# Patient Record
Sex: MALE | Race: WHITE | NOT HISPANIC OR LATINO | Employment: FULL TIME | URBAN - METROPOLITAN AREA
[De-identification: names, ages, dates, MRNs, and addresses within clinical notes are randomized per-mention and may not be internally consistent; named-entity substitution may affect disease eponyms.]

---

## 2018-07-23 ENCOUNTER — OFFICE VISIT (OUTPATIENT)
Dept: GASTROENTEROLOGY | Facility: CLINIC | Age: 33
End: 2018-07-23

## 2018-07-23 VITALS
HEIGHT: 70 IN | BODY MASS INDEX: 29.12 KG/M2 | DIASTOLIC BLOOD PRESSURE: 82 MMHG | WEIGHT: 203.4 LBS | TEMPERATURE: 97.8 F | SYSTOLIC BLOOD PRESSURE: 110 MMHG

## 2018-07-23 DIAGNOSIS — R10.13 EPIGASTRIC PAIN: ICD-10-CM

## 2018-07-23 DIAGNOSIS — K21.9 GASTROESOPHAGEAL REFLUX DISEASE, ESOPHAGITIS PRESENCE NOT SPECIFIED: ICD-10-CM

## 2018-07-23 DIAGNOSIS — R10.9 ABDOMINAL PAIN, UNSPECIFIED ABDOMINAL LOCATION: Primary | ICD-10-CM

## 2018-07-23 PROCEDURE — 99204 OFFICE O/P NEW MOD 45 MIN: CPT | Performed by: INTERNAL MEDICINE

## 2018-07-23 RX ORDER — SODIUM CHLORIDE, SODIUM LACTATE, POTASSIUM CHLORIDE, CALCIUM CHLORIDE 600; 310; 30; 20 MG/100ML; MG/100ML; MG/100ML; MG/100ML
30 INJECTION, SOLUTION INTRAVENOUS CONTINUOUS
Status: CANCELLED | OUTPATIENT
Start: 2018-08-29 | End: 2019-08-29

## 2018-07-23 RX ORDER — AMLODIPINE BESYLATE 5 MG/1
TABLET ORAL
COMMUNITY
Start: 2018-06-06 | End: 2020-10-23

## 2018-07-23 RX ORDER — PANTOPRAZOLE SODIUM 40 MG/1
TABLET, DELAYED RELEASE ORAL
COMMUNITY
Start: 2018-05-03 | End: 2020-10-23

## 2018-07-23 NOTE — PROGRESS NOTES
Chief Complaint   Patient presents with   • Abdominal Pain        Shahzad Millan is a  32 y.o. male here for an initial visit for Abdominal pain, GERD    HPI This 32-year-old  male patient of Dr. Denzel Osuna presents with a 3 year history of reflux and epigastric pain.  He been treated in the past with pantoprazole as well as ranitidine, most improvement noted on the proton pump inhibitor.  There were no nocturnal events but certain foods do precipitate symptoms such as peanuts, beans, and tomato-based products.  He has had a weight gain of approximately 25 pounds over the recent past.  Family history is negative for GI related complaints.  His normal bowel pattern is once daily without any melena or bright red blood per rectum.    Past Medical History:   Diagnosis Date   • Hypertension        Current Outpatient Prescriptions   Medication Sig Dispense Refill   • amLODIPine (NORVASC) 5 MG tablet      • pantoprazole (PROTONIX) 40 MG EC tablet        No current facility-administered medications for this visit.        PRN Meds:.    No Known Allergies    Social History     Social History   • Marital status:      Spouse name: N/A   • Number of children: N/A   • Years of education: N/A     Occupational History   • Not on file.     Social History Main Topics   • Smoking status: Former Smoker     Types: Cigarettes     Quit date: 2015   • Smokeless tobacco: Not on file   • Alcohol use No   • Drug use: Unknown   • Sexual activity: Not on file     Other Topics Concern   • Not on file     Social History Narrative   • No narrative on file       History reviewed. No pertinent family history.    Review of Systems   Constitutional: Negative for activity change, appetite change, fatigue and unexpected weight change.   HENT: Negative for congestion, facial swelling, sore throat, trouble swallowing and voice change.    Eyes: Negative for photophobia and visual disturbance.   Respiratory: Negative for cough and  choking.    Cardiovascular: Negative for chest pain.   Gastrointestinal: Positive for abdominal pain. Negative for abdominal distention, anal bleeding, blood in stool, constipation, diarrhea, nausea, rectal pain and vomiting.        GERD   Endocrine: Negative for polyphagia.   Musculoskeletal: Negative for arthralgias, gait problem and joint swelling.   Skin: Negative for color change, pallor and rash.   Allergic/Immunologic: Negative for food allergies.   Neurological: Negative for speech difficulty and headaches.   Hematological: Does not bruise/bleed easily.   Psychiatric/Behavioral: Negative for agitation, confusion and sleep disturbance.       There were no vitals filed for this visit.    Physical Exam   Constitutional: He is oriented to person, place, and time. He appears well-developed and well-nourished. No distress.   HENT:   Head: Normocephalic.   Mouth/Throat: Oropharynx is clear and moist. No oropharyngeal exudate.   Eyes: Conjunctivae and EOM are normal. No scleral icterus.   Neck: Normal range of motion. No thyromegaly present.   Cardiovascular: Normal rate and regular rhythm.    No murmur heard.  Pulmonary/Chest: Breath sounds normal. No respiratory distress. He has no wheezes. He has no rales.   Abdominal: Soft. Bowel sounds are normal. He exhibits no distension and no mass. There is no hepatosplenomegaly. There is no tenderness.   Musculoskeletal: Normal range of motion. He exhibits no edema or tenderness.   Lymphadenopathy:     He has no cervical adenopathy.   Neurological: He is alert and oriented to person, place, and time.   Skin: Skin is warm and dry. No rash noted. He is not diaphoretic. No erythema.   Psychiatric: He has a normal mood and affect. His behavior is normal.   Vitals reviewed.      ASSESSMENT   #1 epigastric pain  #2 GERD      PLAN  Schedule EGD      ICD-10-CM ICD-9-CM   1. Abdominal pain, unspecified abdominal location R10.9 789.00

## 2018-08-14 PROBLEM — R10.13 EPIGASTRIC PAIN: Status: ACTIVE | Noted: 2018-08-14

## 2018-08-14 PROBLEM — K21.9 GASTROESOPHAGEAL REFLUX DISEASE: Status: ACTIVE | Noted: 2018-08-14

## 2018-08-29 ENCOUNTER — ANESTHESIA (OUTPATIENT)
Dept: GASTROENTEROLOGY | Facility: HOSPITAL | Age: 33
End: 2018-08-29

## 2018-08-29 ENCOUNTER — ANESTHESIA EVENT (OUTPATIENT)
Dept: GASTROENTEROLOGY | Facility: HOSPITAL | Age: 33
End: 2018-08-29

## 2018-08-29 ENCOUNTER — HOSPITAL ENCOUNTER (OUTPATIENT)
Facility: HOSPITAL | Age: 33
Setting detail: HOSPITAL OUTPATIENT SURGERY
Discharge: HOME | End: 2018-08-29
Attending: INTERNAL MEDICINE | Admitting: INTERNAL MEDICINE

## 2018-08-29 VITALS
OXYGEN SATURATION: 95 % | WEIGHT: 202.1 LBS | BODY MASS INDEX: 28.29 KG/M2 | TEMPERATURE: 98.4 F | SYSTOLIC BLOOD PRESSURE: 113 MMHG | RESPIRATION RATE: 16 BRPM | HEIGHT: 71 IN | HEART RATE: 80 BPM | DIASTOLIC BLOOD PRESSURE: 77 MMHG

## 2018-08-29 DIAGNOSIS — K21.9 GASTROESOPHAGEAL REFLUX DISEASE, ESOPHAGITIS PRESENCE NOT SPECIFIED: ICD-10-CM

## 2018-08-29 DIAGNOSIS — R10.13 EPIGASTRIC PAIN: ICD-10-CM

## 2018-08-29 PROCEDURE — 87081 CULTURE SCREEN ONLY: CPT | Performed by: INTERNAL MEDICINE

## 2018-08-29 PROCEDURE — 25010000002 PROPOFOL 10 MG/ML EMULSION: Performed by: ANESTHESIOLOGY

## 2018-08-29 PROCEDURE — 43239 EGD BIOPSY SINGLE/MULTIPLE: CPT | Performed by: INTERNAL MEDICINE

## 2018-08-29 PROCEDURE — S0260 H&P FOR SURGERY: HCPCS | Performed by: INTERNAL MEDICINE

## 2018-08-29 PROCEDURE — 88312 SPECIAL STAINS GROUP 1: CPT | Performed by: INTERNAL MEDICINE

## 2018-08-29 PROCEDURE — 88305 TISSUE EXAM BY PATHOLOGIST: CPT | Performed by: INTERNAL MEDICINE

## 2018-08-29 RX ORDER — LIDOCAINE HYDROCHLORIDE 20 MG/ML
INJECTION, SOLUTION INFILTRATION; PERINEURAL AS NEEDED
Status: DISCONTINUED | OUTPATIENT
Start: 2018-08-29 | End: 2018-08-29 | Stop reason: SURG

## 2018-08-29 RX ORDER — SODIUM CHLORIDE, SODIUM LACTATE, POTASSIUM CHLORIDE, CALCIUM CHLORIDE 600; 310; 30; 20 MG/100ML; MG/100ML; MG/100ML; MG/100ML
30 INJECTION, SOLUTION INTRAVENOUS CONTINUOUS
Status: DISCONTINUED | OUTPATIENT
Start: 2018-08-29 | End: 2018-08-29 | Stop reason: HOSPADM

## 2018-08-29 RX ORDER — PROPOFOL 10 MG/ML
VIAL (ML) INTRAVENOUS AS NEEDED
Status: DISCONTINUED | OUTPATIENT
Start: 2018-08-29 | End: 2018-08-29 | Stop reason: SURG

## 2018-08-29 RX ORDER — PROPOFOL 10 MG/ML
VIAL (ML) INTRAVENOUS CONTINUOUS PRN
Status: DISCONTINUED | OUTPATIENT
Start: 2018-08-29 | End: 2018-08-29 | Stop reason: SURG

## 2018-08-29 RX ADMIN — PROPOFOL 100 MCG/KG/MIN: 10 INJECTION, EMULSION INTRAVENOUS at 14:27

## 2018-08-29 RX ADMIN — SODIUM CHLORIDE, POTASSIUM CHLORIDE, SODIUM LACTATE AND CALCIUM CHLORIDE 30 ML/HR: 600; 310; 30; 20 INJECTION, SOLUTION INTRAVENOUS at 13:30

## 2018-08-29 RX ADMIN — LIDOCAINE HYDROCHLORIDE 30 MG: 20 INJECTION, SOLUTION INFILTRATION; PERINEURAL at 14:26

## 2018-08-29 RX ADMIN — PROPOFOL 100 MG: 10 INJECTION, EMULSION INTRAVENOUS at 14:27

## 2018-08-29 NOTE — ANESTHESIA PREPROCEDURE EVALUATION
Anesthesia Evaluation     Patient summary reviewed and Nursing notes reviewed                Airway   Mallampati: III  TM distance: >3 FB  Neck ROM: full  No difficulty expected  Dental - normal exam     Pulmonary - negative pulmonary ROS and normal exam   Cardiovascular - normal exam    (+) hypertension,       Neuro/Psych- negative ROS  GI/Hepatic/Renal/Endo    (+)  GERD,      Musculoskeletal (-) negative ROS    Abdominal    Substance History - negative use     OB/GYN negative ob/gyn ROS         Other                        Anesthesia Plan    ASA 2     MAC     intravenous induction   Anesthetic plan and risks discussed with patient.

## 2018-08-29 NOTE — ANESTHESIA POSTPROCEDURE EVALUATION
Patient: Shahzad Millan    Procedure Summary     Date:  08/29/18 Room / Location:   [facility]    Anesthesia Start:  1424 Anesthesia Stop:  1441    Procedure:  ESOPHAGOGASTRODUODENOSCOPY (N/A Esophagus) Diagnosis:       Epigastric pain      Gastroesophageal reflux disease, esophagitis presence not specified      (Epigastric pain [R10.13])      (Gastroesophageal reflux disease, esophagitis presence not specified [K21.9])    Surgeon:  Aris Washington MD Provider:  Emely Vicente MD    Anesthesia Type:  MAC ASA Status:  2          Anesthesia Type: MAC  Last vitals  BP   129/82 (08/29/18 1326)   Temp   36.9 °C (98.4 °F) (08/29/18 1326)   Pulse   89 (08/29/18 1326)   Resp   16 (08/29/18 1326)     SpO2   100 % (08/29/18 1326)     Post Anesthesia Care and Evaluation    Patient location during evaluation: bedside  Patient participation: complete - patient participated  Level of consciousness: awake  Pain management: adequate  Airway patency: patent  Anesthetic complications: No anesthetic complications    Cardiovascular status: acceptable  Respiratory status: acceptable  Hydration status: acceptable

## 2018-08-30 LAB
CYTO UR: NORMAL
LAB AP CASE REPORT: NORMAL
PATH REPORT.FINAL DX SPEC: NORMAL
PATH REPORT.GROSS SPEC: NORMAL
UREASE TISS QL: NEGATIVE

## 2018-08-31 ENCOUNTER — TELEPHONE (OUTPATIENT)
Dept: GASTROENTEROLOGY | Facility: CLINIC | Age: 33
End: 2018-08-31

## 2020-07-15 ENCOUNTER — TRANSCRIBE ORDERS (OUTPATIENT)
Dept: ADMINISTRATIVE | Facility: HOSPITAL | Age: 35
End: 2020-07-15

## 2020-07-15 DIAGNOSIS — R00.2 PALPITATIONS: Primary | ICD-10-CM

## 2020-08-10 ENCOUNTER — HOSPITAL ENCOUNTER (OUTPATIENT)
Dept: CARDIOLOGY | Facility: HOSPITAL | Age: 35
Discharge: HOME | End: 2020-08-10
Admitting: INTERNAL MEDICINE

## 2020-08-10 DIAGNOSIS — R00.2 PALPITATIONS: ICD-10-CM

## 2020-08-10 PROCEDURE — 0296T HC EXT ECG > 48HR TO 21 DAY RCRD W/CONECT INTL RCRD: CPT

## 2020-09-01 PROCEDURE — 0298T HOLTER MONITOR - 72 HOUR UP TO 21 DAY: CPT | Performed by: INTERNAL MEDICINE

## 2021-04-16 ENCOUNTER — BULK ORDERING (OUTPATIENT)
Dept: CASE MANAGEMENT | Facility: OTHER | Age: 36
End: 2021-04-16

## 2021-04-16 DIAGNOSIS — Z23 IMMUNIZATION DUE: ICD-10-CM

## 2025-02-20 ENCOUNTER — PATIENT ROUNDING (BHMG ONLY) (OUTPATIENT)
Dept: NEUROLOGY | Facility: CLINIC | Age: 40
End: 2025-02-20
Payer: COMMERCIAL

## 2025-02-20 ENCOUNTER — OFFICE VISIT (OUTPATIENT)
Dept: NEUROLOGY | Facility: CLINIC | Age: 40
End: 2025-02-20
Payer: COMMERCIAL

## 2025-02-20 VITALS
BODY MASS INDEX: 27.58 KG/M2 | HEIGHT: 71 IN | WEIGHT: 197 LBS | HEART RATE: 98 BPM | OXYGEN SATURATION: 98 % | SYSTOLIC BLOOD PRESSURE: 134 MMHG | DIASTOLIC BLOOD PRESSURE: 92 MMHG

## 2025-02-20 DIAGNOSIS — G43.109 MIGRAINE WITH AURA AND WITHOUT STATUS MIGRAINOSUS, NOT INTRACTABLE: Primary | ICD-10-CM

## 2025-02-20 PROBLEM — J30.2 SEASONAL ALLERGIC RHINITIS: Status: ACTIVE | Noted: 2019-05-11

## 2025-02-20 PROBLEM — R79.89 DECREASED TESTOSTERONE LEVEL: Status: ACTIVE | Noted: 2021-11-15

## 2025-02-20 PROBLEM — G47.00 INSOMNIA: Status: ACTIVE | Noted: 2021-08-10

## 2025-02-20 PROBLEM — E78.5 HYPERLIPIDEMIA: Status: ACTIVE | Noted: 2019-10-14

## 2025-02-20 PROBLEM — R73.03 PREDIABETES: Status: ACTIVE | Noted: 2023-05-03

## 2025-02-20 PROBLEM — K76.0 STEATOSIS OF LIVER: Status: ACTIVE | Noted: 2019-10-14

## 2025-02-20 PROBLEM — Z83.3 FAMILY HISTORY OF DIABETES MELLITUS: Status: ACTIVE | Noted: 2020-07-15

## 2025-02-20 PROBLEM — K30 NONULCER DYSPEPSIA: Status: ACTIVE | Noted: 2021-08-10

## 2025-02-20 PROBLEM — R00.2 INTERMITTENT PALPITATIONS: Status: ACTIVE | Noted: 2020-07-15

## 2025-02-20 PROBLEM — F41.1 GENERALIZED ANXIETY DISORDER: Status: ACTIVE | Noted: 2022-09-13

## 2025-02-20 PROBLEM — G43.909 MIGRAINE: Status: ACTIVE | Noted: 2021-08-10

## 2025-02-20 PROBLEM — I10 ESSENTIAL HYPERTENSION: Status: ACTIVE | Noted: 2019-05-11

## 2025-02-20 RX ORDER — RIBOFLAVIN (VITAMIN B2) 400 MG
1 TABLET ORAL DAILY
Qty: 30 TABLET | Refills: 2 | Status: SHIPPED | OUTPATIENT
Start: 2025-02-20 | End: 2025-05-21

## 2025-02-20 RX ORDER — MAGNESIUM OXIDE 400 MG/1
400 TABLET ORAL DAILY
Qty: 30 TABLET | Refills: 2 | Status: SHIPPED | OUTPATIENT
Start: 2025-02-20 | End: 2025-05-21

## 2025-02-20 NOTE — PROGRESS NOTES
[facility] NEUROLOGY         Date of Visit: 2025    Name: Shahzad Millan    :  1985    PCP: Denzel Osuna MD    Visit Type: an initial evaluation  Chief Complaint   Patient presents with    Migraine     From [place], has had migraines since he was young.              Subjective     Patient ID: Shahzad is a 39 y.o. male.         History of Present Illness  I had the pleasure of seeing your patient for the first time today.  As you may know he is a 39-year-old male here today for initial evaluation for migraine headache.  He was referred by his primary care physician.    History:    Patient does have history of hypertension, hyperlipidemia, migraine headache, GERD, insomnia.    Patient states that he has been experiencing symptoms of migraine headaches since about the age of 12.  He is from [place] however moved [place] in .  He states that his first migraine headache when he was 12 years old lasted at least 5 days in the right duration and required a hospitalization.  He does not believe any of his testing came back abnormal at that time.  Since then patient has been managing headaches mainly with over-the-counter therapies until he moved here to the [place] and was started on some migraine medications.    Patient's medications have ranged from ibuprofen, Tylenol, Excedrin, sumatriptan, rizatriptan, Ubrelvy, and Nurtec.  He also at one point placed on amitriptyline for headache prophylaxis however he did not feel that this was very effective for him.  He states that of the abortive medications Ubrelvy and Nurtec have been the most effective typically reporting headache within 45 minutes to an hour without side effect complaints.  He has had difficulty however getting these medications approved with insurance.    Patient does report family history of migraine with his father also experiencing migraine headache.  He has not had any recent imaging of the brain since  childhood.  He has not noticed any specific change in his headache symptoms.    Current:    Patient currently reports approximately 5 days of headache for 30 days all of which are migrainous in nature.  Headaches typically start on the left side of the head behind his left eye.  They are associated with sensitivity to light, sound, nausea, vomiting, blurriness of vision, and dizziness.  He states that the Nurtec and Ubrelvy samples he has been given by his primary care been very effective in helping with the management of these headaches.  He states that over-the-counter medications are typically not effective.  He also will lay in the quiet dark room.  He does notice that stress, lack of sleep, or a spike in blood pressure may trigger migraine headache for him.  He does note that about half the headaches he wakes with in the morning.  He denies any episodes of apnea or snoring.  No other new neurological complaints at today's visit.  See headache questionnaire dated 2/20/2025 for additional information.      The following portions of the patient's history were reviewed and updated as appropriate: allergies, current medications, past family history, past medical history, past social history, past surgical history, and problem list.                 Review of Systems   Constitutional:  Negative for activity change, appetite change, fatigue and unexpected weight change.   HENT:  Negative for hearing loss, tinnitus and trouble swallowing.         Phonophobia   Eyes:  Positive for photophobia and visual disturbance. Negative for pain.   Respiratory:  Negative for chest tightness and shortness of breath.    Cardiovascular:  Negative for palpitations.   Gastrointestinal:  Positive for nausea and vomiting.   Musculoskeletal:  Negative for neck pain.   Neurological:  Positive for dizziness and headaches. Negative for syncope, facial asymmetry, speech difficulty, weakness, light-headedness and numbness.  "  Psychiatric/Behavioral:  Negative for confusion and sleep disturbance.             Current Medications:    Current Outpatient Medications   Medication Instructions    magnesium oxide (MAG-OX) 400 mg, Oral, Daily    Riboflavin 400 MG tablet 1 tablet, Oral, Daily    rimegepant sulfate ODT (NURTEC) 75 mg, Once    ubrogepant (UBRELVY) 100 mg, Oral, As Needed, May repeat dose in 2 hours. Max of 2 doses in 24 hours.          /92   Pulse 98   Ht 180.3 cm (70.98\")   Wt 89.4 kg (197 lb)   SpO2 98%   BMI 27.49 kg/m²                Objective     Neurological Exam  Mental Status  Awake, alert and oriented to person, place and time. Speech is normal. Language is fluent with no aphasia.    Cranial Nerves  CN II: Visual fields full to confrontation.  CN III, IV, VI: Extraocular movements intact bilaterally. Normal lids and orbits bilaterally. Pupils equal round and reactive to light bilaterally.  CN V: Facial sensation is normal.  CN VII: Full and symmetric facial movement.  CN IX, X: Palate elevates symmetrically  CN XI: Shoulder shrug strength is normal.  CN XII: Tongue midline without atrophy or fasciculations.    Motor  Normal muscle bulk throughout. Normal muscle tone. No abnormal involuntary movements. Strength is 5/5 throughout all four extremities.    Sensory  Sensation is intact to light touch, pinprick, vibration and proprioception in all four extremities.    Reflexes  Deep tendon reflexes are 2+ and symmetric in all four extremities.    Coordination    Finger-to-nose, rapid alternating movements and heel-to-shin normal bilaterally without dysmetria.    Gait  Normal casual, toe, heel and tandem gait.      Physical Exam  Constitutional:       Appearance: Normal appearance. He is normal weight.   HENT:      Head: Normocephalic.   Eyes:      General: Lids are normal.      Extraocular Movements: Extraocular movements intact.      Pupils: Pupils are equal, round, and reactive to light.   Pulmonary:      Effort: " Pulmonary effort is normal.   Musculoskeletal:         General: Normal range of motion.   Skin:     General: Skin is warm.   Neurological:      Motor: Motor strength is normal.     Coordination: Coordination is intact.      Deep Tendon Reflexes: Reflexes are normal and symmetric.   Psychiatric:         Mood and Affect: Mood normal.         Speech: Speech normal.         Behavior: Behavior normal.         Thought Content: Thought content normal.                     Assessment & Plan     Diagnoses and all orders for this visit:    1. Migraine with aura and without status migrainosus, not intractable (Primary)  -     magnesium oxide (MAG-OX) 400 MG tablet; Take 1 tablet by mouth Daily for 90 days.  Dispense: 30 tablet; Refill: 2  -     Riboflavin 400 MG tablet; Take 1 tablet by mouth Daily for 90 days.  Dispense: 30 tablet; Refill: 2  -     ubrogepant (Ubrelvy) 100 MG tablet; Take 1 tablet by mouth As Needed (migraine,) for up to 180 days. May repeat dose in 2 hours. Max of 2 doses in 24 hours.  Dispense: 10 tablet; Refill: 5       At this time I would like to start patient on magnesium and vitamin B2 for headache prophylaxis taking 400 mg of each daily.    We did discuss that B2 can change his urine grain and magnesium can sometimes cause stomach upset and to reach out with any concerns.    In addition I would like to try to get Ubrelvy approved for him for abortive treatment as he has tried and failed 2 triptan medications in the past and this medication is effective for him.    We will hold off on any imaging for now as patient's headaches have been stable and the same since he was a child.    Follow-up in 3 to 4 months or sooner if needed.            Keely ALMARAZ    Neurology    [facility] [place]    Phone: [phone]    2/20/2025 , 10:47 EST

## 2025-03-03 ENCOUNTER — TELEPHONE (OUTPATIENT)
Dept: NEUROLOGY | Facility: CLINIC | Age: 40
End: 2025-03-03
Payer: COMMERCIAL

## 2025-03-03 DIAGNOSIS — G43.109 MIGRAINE WITH AURA AND WITHOUT STATUS MIGRAINOSUS, NOT INTRACTABLE: ICD-10-CM

## 2025-03-03 NOTE — TELEPHONE ENCOUNTER
Caller: Shahzad Jade    Relationship: Self    Best call back number: [phone]    Which medication are you concerned about:   ubrogepant (Ubrelvy) 100 MG tablet    Who prescribed you this medication:   MIKAEL    When did you start taking this medication:   NEW RX FOR THE PT    What are your concerns:   MIKAEL SENT 3 SCRIPTS TO [facility] ON [address] BUT WHEN THE PT WENT TO PU THEM UP, THE PHARMACY SAID THEY ONLY RECEIVED TWO SCRIPTS.    THE PHARMACY DID CONFIRM RECEIVING THE REQUEST BUT TOLD PT THEY HAD NOT.    PLEASE RESEND THE UBRELVY FOR THE PT.

## 2025-03-05 ENCOUNTER — PRIOR AUTHORIZATION (OUTPATIENT)
Dept: NEUROLOGY | Facility: CLINIC | Age: 40
End: 2025-03-05
Payer: COMMERCIAL

## 2025-03-07 NOTE — TELEPHONE ENCOUNTER
PA Case: [id number], Status: Approved, Coverage Starts on: 3/5/2025 12:00:00 AM, Coverage Ends on: 3/5/2026 12:00:00 AM.    Pharmacy Notified.

## 2025-05-29 ENCOUNTER — OFFICE VISIT (OUTPATIENT)
Dept: NEUROLOGY | Facility: CLINIC | Age: 40
End: 2025-05-29
Payer: COMMERCIAL

## 2025-05-29 VITALS
DIASTOLIC BLOOD PRESSURE: 92 MMHG | HEART RATE: 111 BPM | SYSTOLIC BLOOD PRESSURE: 144 MMHG | OXYGEN SATURATION: 97 % | HEIGHT: 71 IN | BODY MASS INDEX: 27.49 KG/M2

## 2025-05-29 DIAGNOSIS — G43.109 MIGRAINE WITH AURA AND WITHOUT STATUS MIGRAINOSUS, NOT INTRACTABLE: Primary | ICD-10-CM

## 2025-05-29 RX ORDER — RIBOFLAVIN (VITAMIN B2) 100 MG
100 TABLET ORAL DAILY
COMMUNITY
End: 2025-05-29

## 2025-05-29 RX ORDER — LANOLIN ALCOHOL/MO/W.PET/CERES
1 CREAM (GRAM) TOPICAL DAILY
COMMUNITY
Start: 2025-05-09 | End: 2025-05-29

## 2025-05-29 RX ORDER — PROPRANOLOL HYDROCHLORIDE 10 MG/1
10 TABLET ORAL 2 TIMES DAILY
Qty: 180 TABLET | Refills: 0 | Status: SHIPPED | OUTPATIENT
Start: 2025-05-29 | End: 2025-08-27

## 2025-05-30 NOTE — PROGRESS NOTES
[facility] NEUROLOGY         Date of Visit: 2025    Name: Shahzad Millan    :  1985    PCP: Denzel Osuna MD    Visit Type: follow-up  Chief Complaint   Patient presents with    Migraine             Subjective     Patient ID: Shahzad is a 39 y.o. male.         History of Present Illness  I had the pleasure of seeing your patient  today.  As you may know he is a 39-year-old male here today for follow upfor migraine headache.  He was referred by his primary care physician.     History:    Patient does have history of hypertension, hyperlipidemia, migraine headache, GERD, insomnia.    Patient states that he has been experiencing symptoms of migraine headaches since about the age of 12.  He is from [place] however moved [place] in .  He states that his first migraine headache when he was 12 years old lasted at least 5 days in the right duration and required a hospitalization.  He does not believe any of his testing came back abnormal at that time.  Since then patient has been managing headaches mainly with over-the-counter therapies until he moved here to the [place] and was started on some migraine medications.    Patient's medications have ranged from ibuprofen, Tylenol, Excedrin, sumatriptan, rizatriptan, Ubrelvy, and Nurtec.  He also at one point placed on amitriptyline for headache prophylaxis however he did not feel that this was very effective for him.  He states that of the abortive medications Ubrelvy and Nurtec have been the most effective typically reporting headache within 45 minutes to an hour without side effect complaints.  He has had difficulty however getting these medications approved with insurance.    Patient does report family history of migraine with his father also experiencing migraine headache.  He has not had any recent imaging of the brain since childhood.  He has not noticed any specific change in his headache  "symptoms.    Current:    Patient did start magnesium and B2 for headache prophylaxis and has tried Ubrelvy for abortive treatment for migraine headache symptoms.    Patient states that he continues to have approximately 5 days of headache for 30 days all of which are migrainous in nature.  He states that he does not feel that the magnesium and B2 have improved any of his headache symptoms however he does feel that the Ubrelvy is very effective for aborting headache when they do occur.  He states the Ubrelvy does completely abort headache within 1 to 2 hours and he is able to function throughout his normal day after taking medication.  He has not noticed any side effect complaints.  He denies any new symptoms with the headaches.  No other new or worsening neurologic complaints at today's visit.      The following portions of the patient's history were reviewed and updated as appropriate: allergies, current medications, past family history, past medical history, past social history, past surgical history, and problem list.                 Review of Systems   Constitutional:  Negative for activity change, appetite change and unexpected weight change.   HENT:  Negative for hearing loss, tinnitus and trouble swallowing.         Phonophobia   Eyes:  Positive for visual disturbance. Negative for pain.   Respiratory:  Negative for chest tightness.    Cardiovascular:  Negative for palpitations.   Musculoskeletal:  Negative for neck pain.   Neurological:  Negative for syncope, facial asymmetry, speech difficulty, light-headedness and numbness.   Psychiatric/Behavioral:  Negative for confusion and sleep disturbance.             Current Medications:    Current Outpatient Medications   Medication Instructions    propranolol (INDERAL) 10 mg, Oral, 2 Times Daily    ubrogepant (UBRELVY) 100 mg, Oral, As Needed, May repeat dose in 2 hours. Max of 2 doses in 24 hours.          /92   Pulse 111   Ht 180.3 cm (70.98\")   SpO2 " 97%   BMI 27.49 kg/m²                Objective     Neurological Exam  Mental Status  Awake, alert and oriented to person, place and time. Speech is normal. Language is fluent with no aphasia.    Cranial Nerves  CN II: Visual fields full to confrontation.  CN III, IV, VI: Extraocular movements intact bilaterally. Normal lids and orbits bilaterally. Pupils equal round and reactive to light bilaterally.  CN V: Facial sensation is normal.  CN VII: Full and symmetric facial movement.  CN IX, X: Palate elevates symmetrically  CN XI: Shoulder shrug strength is normal.  CN XII: Tongue midline without atrophy or fasciculations.    Motor  Normal muscle bulk throughout. Normal muscle tone. No abnormal involuntary movements. Strength is 5/5 throughout all four extremities.    Sensory  Sensation is intact to light touch, pinprick, vibration and proprioception in all four extremities.    Reflexes  Deep tendon reflexes are 2+ and symmetric in all four extremities.    Coordination    Finger-to-nose, rapid alternating movements and heel-to-shin normal bilaterally without dysmetria.    Gait  Normal casual, toe, heel and tandem gait.      Physical Exam  Constitutional:       Appearance: Normal appearance. He is normal weight.   HENT:      Head: Normocephalic.   Eyes:      General: Lids are normal.      Extraocular Movements: Extraocular movements intact.      Pupils: Pupils are equal, round, and reactive to light.   Pulmonary:      Effort: Pulmonary effort is normal.   Musculoskeletal:         General: Normal range of motion.   Skin:     General: Skin is warm.   Neurological:      Motor: Motor strength is normal.     Coordination: Coordination is intact.      Deep Tendon Reflexes: Reflexes are normal and symmetric.   Psychiatric:         Mood and Affect: Mood normal.         Speech: Speech normal.         Behavior: Behavior normal.         Thought Content: Thought content normal.                     Assessment & Plan     Diagnoses  and all orders for this visit:    1. Migraine with aura and without status migrainosus, not intractable (Primary)  -     propranolol (INDERAL) 10 MG tablet; Take 1 tablet by mouth 2 (Two) Times a Day for 90 days.  Dispense: 180 tablet; Refill: 0  -     ubrogepant (Ubrelvy) 100 MG tablet; Take 1 tablet by mouth As Needed (migraine,) for up to 180 days. May repeat dose in 2 hours. Max of 2 doses in 24 hours.  Dispense: 10 tablet; Refill: 5       At this time we will go ahead and discontinue magnesium and B2 for headache prophylaxis and trial patient on propranolol 10 mg twice daily.    Patient was encouraged to monitor heart rate and blood pressure medication report any low blood pressure, heart rate, or dizziness.    We will also go ahead and continue Ubrelvy for abortive treatment.    Follow-up in 2 months or sooner if needed.            Keely ALMARAZ    Neurology    [facility] [place]    Phone: [phone]    5/29/2025 , 22:09 EDT

## (undated) DEVICE — BITEBLOCK OMNI BLOC

## (undated) DEVICE — Device: Brand: DEFENDO AIR/WATER/SUCTION AND BIOPSY VALVE

## (undated) DEVICE — CANN NASL CO2 TRULINK W/O2 A/

## (undated) DEVICE — TUBING, SUCTION, 1/4" X 10', STRAIGHT: Brand: MEDLINE